# Patient Record
Sex: MALE | Race: WHITE | NOT HISPANIC OR LATINO
[De-identification: names, ages, dates, MRNs, and addresses within clinical notes are randomized per-mention and may not be internally consistent; named-entity substitution may affect disease eponyms.]

---

## 2021-08-07 PROBLEM — Z00.00 ENCOUNTER FOR PREVENTIVE HEALTH EXAMINATION: Status: ACTIVE | Noted: 2021-08-07

## 2021-08-09 ENCOUNTER — APPOINTMENT (OUTPATIENT)
Dept: GASTROENTEROLOGY | Facility: CLINIC | Age: 56
End: 2021-08-09
Payer: COMMERCIAL

## 2021-08-09 VITALS
OXYGEN SATURATION: 96 % | HEART RATE: 100 BPM | HEIGHT: 71.5 IN | BODY MASS INDEX: 34.37 KG/M2 | DIASTOLIC BLOOD PRESSURE: 100 MMHG | TEMPERATURE: 97.1 F | WEIGHT: 251 LBS | SYSTOLIC BLOOD PRESSURE: 141 MMHG

## 2021-08-09 DIAGNOSIS — Z86.59 PERSONAL HISTORY OF OTHER MENTAL AND BEHAVIORAL DISORDERS: ICD-10-CM

## 2021-08-09 DIAGNOSIS — Z82.49 FAMILY HISTORY OF ISCHEMIC HEART DISEASE AND OTHER DISEASES OF THE CIRCULATORY SYSTEM: ICD-10-CM

## 2021-08-09 DIAGNOSIS — Z86.79 PERSONAL HISTORY OF OTHER DISEASES OF THE CIRCULATORY SYSTEM: ICD-10-CM

## 2021-08-09 DIAGNOSIS — Z80.9 FAMILY HISTORY OF MALIGNANT NEOPLASM, UNSPECIFIED: ICD-10-CM

## 2021-08-09 DIAGNOSIS — K21.9 GASTRO-ESOPHAGEAL REFLUX DISEASE W/OUT ESOPHAGITIS: ICD-10-CM

## 2021-08-09 DIAGNOSIS — Z78.9 OTHER SPECIFIED HEALTH STATUS: ICD-10-CM

## 2021-08-09 PROCEDURE — 99204 OFFICE O/P NEW MOD 45 MIN: CPT

## 2021-08-09 RX ORDER — GABAPENTIN 800 MG/1
800 TABLET, COATED ORAL
Qty: 90 | Refills: 0 | Status: ACTIVE | COMMUNITY
Start: 2021-08-05

## 2021-08-09 RX ORDER — GABAPENTIN 600 MG/1
600 TABLET, COATED ORAL
Qty: 90 | Refills: 0 | Status: ACTIVE | COMMUNITY
Start: 2021-07-02

## 2021-08-09 RX ORDER — CASTOR OIL
OIL (ML) ORAL
Qty: 3 | Refills: 0 | Status: ACTIVE | COMMUNITY
Start: 2021-08-09 | End: 1900-01-01

## 2021-08-09 RX ORDER — LOSARTAN POTASSIUM 50 MG/1
50 TABLET, FILM COATED ORAL
Qty: 30 | Refills: 0 | Status: ACTIVE | COMMUNITY
Start: 2021-08-06

## 2021-08-09 RX ORDER — METHYLPHENIDATE HYDROCHLORIDE 54 MG/1
54 TABLET, EXTENDED RELEASE ORAL
Qty: 30 | Refills: 0 | Status: ACTIVE | COMMUNITY
Start: 2021-08-05

## 2021-08-09 RX ORDER — BENZTROPINE MESYLATE 1 MG/1
1 TABLET ORAL
Qty: 30 | Refills: 0 | Status: ACTIVE | COMMUNITY
Start: 2021-08-05

## 2021-08-09 RX ORDER — ROSUVASTATIN CALCIUM 20 MG/1
20 TABLET, FILM COATED ORAL
Qty: 30 | Refills: 0 | Status: ACTIVE | COMMUNITY
Start: 2021-08-06

## 2021-08-09 RX ORDER — FUROSEMIDE 20 MG/1
20 TABLET ORAL
Qty: 30 | Refills: 0 | Status: ACTIVE | COMMUNITY
Start: 2021-07-01

## 2021-08-09 RX ORDER — ARIPIPRAZOLE 5 MG/1
5 TABLET ORAL
Qty: 30 | Refills: 0 | Status: ACTIVE | COMMUNITY
Start: 2021-06-29

## 2021-08-09 RX ORDER — METOPROLOL SUCCINATE 25 MG/1
25 TABLET, EXTENDED RELEASE ORAL
Qty: 30 | Refills: 0 | Status: ACTIVE | COMMUNITY
Start: 2021-08-06

## 2021-08-09 RX ORDER — ARIPIPRAZOLE 15 MG/1
15 TABLET ORAL
Qty: 30 | Refills: 0 | Status: ACTIVE | COMMUNITY
Start: 2021-08-05

## 2021-08-09 NOTE — ASSESSMENT
[FreeTextEntry1] : GERD: The patient was advised to avoid late-night meals and dietary indiscretions.  The patient was advised to avoid fried and fatty foods.  The patient was advised to abide by an anti-GERD diet. The patient was given a pamphlet foranti-GERD.  The patient and I reviewed the anti-GERD diet at length. I recommend a trial of Pepcid 40 mg twice a day x 3 months for the symptoms.\par If the symptoms persist, the patient may require an upper endoscopy to assess for peptic ulcer disease versus esophagitis.  The patient was told of the risks and benefits of the procedure.  The patient was told of the risks of perforation, emergency surgery, bleeding, infections and missed lesions.  The patient agreed and will follow-up to reassess the symptoms.\par Family History of Colon Cancer: The patient has a family history of colon cancer.  I recommend a repeat colonoscopy to reassess for colonic polyps.  The patient was told of the risks and benefits of the procedure.  The patient was told of the risks of perforation, emergency surgery, bleeding, infections and missed lesions.  The patient agreed and will schedule for the procedure. The patient can take the antihypertensive medication with a sip of water one hour prior to the procedure. The patient is to be n.p.o. after midnight and bowel prep was given.  The patient is to return for the procedure. The patient agreed and will follow up for the procedure. \par Family History of Colon Polyp: The patient has a family history of colon polyp.  I recommend a repeat colonoscopy to assess for colonic polyps.  The patient was told of the risks and benefits of the procedure.  The patient was told of the risks of perforation, emergency surgery, bleeding, infections and missed lesions.  The patient agreed and will schedule for the procedure. The patient can take the antihypertensive medication with a sip of water one hour prior to the procedure.  The patient is to be n.p.o. after midnight and bowel prep was given.  The patient is to return for the procedure. The patient agreed and will follow up for the procedure. \par Colonoscopy: I recommend a colonoscopy to assess the symptoms.  The patient was told of the risks and benefits of the procedure.  The patient was told of the risks of perforation, emergency surgery, bleeding, infections and missed lesions.  The patient agreed and will schedule for the procedure. The patient can take the antihypertensive medication with a sip of water one hour prior to the procedure. The patient is to hold the diabetic medication the day before and the morning of the procedure. The patient is to hold the blood thinner medication for 5 days prior to the procedure. The patient is to be n.p.o. after midnight and bowel prep was given.  The patient is to return for the procedure. \par Follow-up: The patient is to follow-up in the office in 4 weeks to reassess the symptoms. The patient was told to call the office if any further problems. \par \par \par

## 2021-08-09 NOTE — REVIEW OF SYSTEMS
[Feeling Tired] : feeling tired [Eyesight Problems] : eyesight problems [SOB on Exertion] : shortness of breath during exertion [Heartburn] : heartburn [Anxiety] : anxiety [Depression] : depression [Negative] : Heme/Lymph [FreeTextEntry9] : left hip pain [de-identified] : psoriasis [de-identified] : bipolar disorder

## 2021-08-10 LAB — HEMOCCULT STL QL: NEGATIVE

## 2021-10-08 DIAGNOSIS — Z01.818 ENCOUNTER FOR OTHER PREPROCEDURAL EXAMINATION: ICD-10-CM

## 2021-10-11 ENCOUNTER — APPOINTMENT (OUTPATIENT)
Dept: DISASTER EMERGENCY | Facility: CLINIC | Age: 56
End: 2021-10-11

## 2021-10-14 ENCOUNTER — APPOINTMENT (OUTPATIENT)
Dept: GASTROENTEROLOGY | Facility: HOSPITAL | Age: 56
End: 2021-10-14

## 2022-03-09 ENCOUNTER — APPOINTMENT (OUTPATIENT)
Dept: GASTROENTEROLOGY | Facility: CLINIC | Age: 57
End: 2022-03-09
Payer: COMMERCIAL

## 2022-03-09 VITALS
BODY MASS INDEX: 34.81 KG/M2 | SYSTOLIC BLOOD PRESSURE: 153 MMHG | DIASTOLIC BLOOD PRESSURE: 104 MMHG | HEART RATE: 97 BPM | HEIGHT: 72 IN | WEIGHT: 257 LBS | OXYGEN SATURATION: 97 % | TEMPERATURE: 97.2 F

## 2022-03-09 DIAGNOSIS — Z01.818 ENCOUNTER FOR OTHER PREPROCEDURAL EXAMINATION: ICD-10-CM

## 2022-03-09 DIAGNOSIS — Z91.89 OTHER SPECIFIED PERSONAL RISK FACTORS, NOT ELSEWHERE CLASSIFIED: ICD-10-CM

## 2022-03-09 PROCEDURE — 99214 OFFICE O/P EST MOD 30 MIN: CPT

## 2022-03-09 RX ORDER — POLYETHYLENE GLYCOL 3350 AND ELECTROLYTES WITH LEMON FLAVOR 236; 22.74; 6.74; 5.86; 2.97 G/4L; G/4L; G/4L; G/4L; G/4L
236 POWDER, FOR SOLUTION ORAL
Qty: 1 | Refills: 0 | Status: ACTIVE | COMMUNITY
Start: 2022-03-09 | End: 1900-01-01

## 2022-03-09 NOTE — ASSESSMENT
[FreeTextEntry1] : Family History of Colon Cancer: The patient has a family history of colon cancer. I recommend a repeat colonoscopy to reassess for colonic polyps. The patient was told of the risks and benefits of the procedure. The patient was told of the risks of perforation, emergency surgery, bleeding, infections and missed lesions. The patient agreed and will schedule for the procedure. The patient can take the antihypertensive medication with a sip of water one hour prior to the procedure. The patient is to be n.p.o. after midnight and bowel prep was given. The patient is to return for the procedure. The patient agreed and will follow up for the procedure. \par Family History of Colon Polyp: The patient has a family history of colon polyp. I recommend a repeat colonoscopy to assess for colonic polyps. The patient was told of the risks and benefits of the procedure. The patient was told of the risks of perforation, emergency surgery, bleeding, infections and missed lesions. The patient agreed and will schedule for the procedure. The patient can take the antihypertensive medication with a sip of water one hour prior to the procedure. The patient is to be n.p.o. after midnight and bowel prep was given. The patient is to return for the procedure. The patient agreed and will follow up for the procedure. \par Colonoscopy: I recommend a colonoscopy to assess the symptoms. The patient was told of the risks and benefits of the procedure. The patient was told of the risks of perforation, emergency surgery, bleeding, infections and missed lesions. The patient agreed and will schedule for the procedure. The patient can take the antihypertensive medication with a sip of water one hour prior to the procedure.  The patient is to be n.p.o. after midnight and bowel prep was given. The patient is to return for the procedure. \par Follow-up: The patient is to follow-up in the office in 4 weeks to reassess the symptoms. The patient was told to call the office if any further problems. \par

## 2022-03-09 NOTE — HISTORY OF PRESENT ILLNESS
[None] : had no significant interval events [Heartburn] : denies heartburn [Nausea] : denies nausea [Vomiting] : denies vomiting [Diarrhea] : denies diarrhea [Constipation] : denies constipation [Yellow Skin Or Eyes (Jaundice)] : denies jaundice [Abdominal Pain] : denies abdominal pain [Abdominal Swelling] : denies abdominal swelling [Rectal Pain] : denies rectal pain [Wt Loss ___ Lbs] : recent [unfilled] ~Upound(s) weight loss [Wt Gain ___ Lbs] : no recent weight gain [GERD] : no gastroesophageal reflux disease [Hiatus Hernia] : no hiatus hernia [Peptic Ulcer Disease] : no peptic ulcer disease [Pancreatitis] : no pancreatitis [Cholelithiasis] : no cholelithiasis [Kidney Stone] : no kidney stone [Inflammatory Bowel Disease] : no inflammatory bowel disease [Irritable Bowel Syndrome] : no irritable bowel syndrome [Diverticulitis] : no diverticulitis [Alcohol Abuse] : no alcohol abuse [Malignancy] : no malignancy [Abdominal Surgery] : no abdominal surgery [Appendectomy] : no appendectomy [Cholecystectomy] : no cholecystectomy [de-identified] : The patient has a history significant for hypertension, borderline hypercholesterolemia, sleep apnea not using c-pap and bipolar disorder. The patient states that he is feeling fine. The patient denies any jaundice or pruritus.  The patient complains of chronic lower back pain. The patient denies any abdominal pain.  The patient denies any abdominal gas and bloating.  The patient denies any nausea or vomiting.  The patient complains of occasional cough while eating but denies any gastroesophageal reflux disease or dysphagia. The patient denies any atypical chest pain, shortness of breath or palpitations.  The patient denies any diaphoresis. The patient denies any constipation or diarrhea.  The patient has 1 bowel movement a day. The patient denies a change in bowel habits.  The patient denies a change in caliber of stool.  The patient denies having mucus discharge with the bowel movements.  The patient denies any bright red blood per rectum, melena or hematemesis.  The patient denies any rectal pain or rectal pruritus.  The patient complains of weight loss but denies any anorexia.  The patient admits to losing 4 pounds over the past 1 month. The patient attributes the weight loss to recent change in diet .   He denies any fevers or chills.  The patient is being followed by his cardiologist, Dr. Durbin. According to the patient, the cardiac status is stable. The patient admits to a family history of GI problems. The patient’s mother had a history of colon cancer and sister with colonic polyps.  [de-identified] : (+) prior smoking 2 cigarettes a day x 2 years, stopped x 20 years, (-) ETOH, (-) IVDA\par

## 2022-04-06 LAB — SARS-COV-2 N GENE NPH QL NAA+PROBE: NOT DETECTED

## 2022-04-07 ENCOUNTER — APPOINTMENT (OUTPATIENT)
Dept: GASTROENTEROLOGY | Facility: HOSPITAL | Age: 57
End: 2022-04-07

## 2022-04-07 ENCOUNTER — RESULT REVIEW (OUTPATIENT)
Age: 57
End: 2022-04-07

## 2022-04-07 ENCOUNTER — OUTPATIENT (OUTPATIENT)
Dept: OUTPATIENT SERVICES | Facility: HOSPITAL | Age: 57
LOS: 1 days | End: 2022-04-07
Payer: COMMERCIAL

## 2022-04-07 DIAGNOSIS — Z91.89 OTHER SPECIFIED PERSONAL RISK FACTORS, NOT ELSEWHERE CLASSIFIED: ICD-10-CM

## 2022-04-07 PROCEDURE — 88305 TISSUE EXAM BY PATHOLOGIST: CPT

## 2022-04-07 PROCEDURE — 45380 COLONOSCOPY AND BIOPSY: CPT

## 2022-04-07 PROCEDURE — 45380 COLONOSCOPY AND BIOPSY: CPT | Mod: PT

## 2022-04-07 PROCEDURE — 88305 TISSUE EXAM BY PATHOLOGIST: CPT | Mod: 26

## 2022-04-11 ENCOUNTER — NON-APPOINTMENT (OUTPATIENT)
Age: 57
End: 2022-04-11

## 2022-04-11 LAB — SURGICAL PATHOLOGY STUDY: SIGNIFICANT CHANGE UP

## 2022-07-12 ENCOUNTER — INPATIENT (INPATIENT)
Facility: HOSPITAL | Age: 57
LOS: 1 days | Discharge: HOME | DRG: 69 | End: 2022-07-14
Attending: STUDENT IN AN ORGANIZED HEALTH CARE EDUCATION/TRAINING PROGRAM | Admitting: STUDENT IN AN ORGANIZED HEALTH CARE EDUCATION/TRAINING PROGRAM
Payer: COMMERCIAL

## 2022-07-12 VITALS
OXYGEN SATURATION: 96 % | DIASTOLIC BLOOD PRESSURE: 85 MMHG | TEMPERATURE: 98 F | SYSTOLIC BLOOD PRESSURE: 131 MMHG | WEIGHT: 238.1 LBS | HEIGHT: 72 IN | HEART RATE: 87 BPM | RESPIRATION RATE: 16 BRPM

## 2022-07-12 DIAGNOSIS — G45.9 TRANSIENT CEREBRAL ISCHEMIC ATTACK, UNSPECIFIED: ICD-10-CM

## 2022-07-12 LAB
ALBUMIN SERPL ELPH-MCNC: 3.6 G/DL — SIGNIFICANT CHANGE UP (ref 3.5–5)
ALP SERPL-CCNC: 84 U/L — SIGNIFICANT CHANGE UP (ref 40–120)
ALT FLD-CCNC: 47 U/L DA — SIGNIFICANT CHANGE UP (ref 10–60)
ANION GAP SERPL CALC-SCNC: 2 MMOL/L — LOW (ref 5–17)
APTT BLD: 32.5 SEC — SIGNIFICANT CHANGE UP (ref 27.5–35.5)
AST SERPL-CCNC: 28 U/L — SIGNIFICANT CHANGE UP (ref 10–40)
BASOPHILS # BLD AUTO: 0.02 K/UL — SIGNIFICANT CHANGE UP (ref 0–0.2)
BASOPHILS NFR BLD AUTO: 0.2 % — SIGNIFICANT CHANGE UP (ref 0–2)
BILIRUB SERPL-MCNC: 0.6 MG/DL — SIGNIFICANT CHANGE UP (ref 0.2–1.2)
BUN SERPL-MCNC: 21 MG/DL — HIGH (ref 7–18)
CALCIUM SERPL-MCNC: 9.4 MG/DL — SIGNIFICANT CHANGE UP (ref 8.4–10.5)
CHLORIDE SERPL-SCNC: 106 MMOL/L — SIGNIFICANT CHANGE UP (ref 96–108)
CK SERPL-CCNC: 125 U/L — SIGNIFICANT CHANGE UP (ref 35–232)
CO2 SERPL-SCNC: 31 MMOL/L — SIGNIFICANT CHANGE UP (ref 22–31)
CREAT SERPL-MCNC: 1.29 MG/DL — SIGNIFICANT CHANGE UP (ref 0.5–1.3)
EGFR: 64 ML/MIN/1.73M2 — SIGNIFICANT CHANGE UP
EOSINOPHIL # BLD AUTO: 0.08 K/UL — SIGNIFICANT CHANGE UP (ref 0–0.5)
EOSINOPHIL NFR BLD AUTO: 1 % — SIGNIFICANT CHANGE UP (ref 0–6)
GLUCOSE SERPL-MCNC: 85 MG/DL — SIGNIFICANT CHANGE UP (ref 70–99)
HCT VFR BLD CALC: 46 % — SIGNIFICANT CHANGE UP (ref 39–50)
HGB BLD-MCNC: 15.3 G/DL — SIGNIFICANT CHANGE UP (ref 13–17)
IMM GRANULOCYTES NFR BLD AUTO: 0.4 % — SIGNIFICANT CHANGE UP (ref 0–1.5)
INR BLD: 0.97 RATIO — SIGNIFICANT CHANGE UP (ref 0.88–1.16)
LYMPHOCYTES # BLD AUTO: 2.79 K/UL — SIGNIFICANT CHANGE UP (ref 1–3.3)
LYMPHOCYTES # BLD AUTO: 33.4 % — SIGNIFICANT CHANGE UP (ref 13–44)
MCHC RBC-ENTMCNC: 28.7 PG — SIGNIFICANT CHANGE UP (ref 27–34)
MCHC RBC-ENTMCNC: 33.3 GM/DL — SIGNIFICANT CHANGE UP (ref 32–36)
MCV RBC AUTO: 86.3 FL — SIGNIFICANT CHANGE UP (ref 80–100)
MONOCYTES # BLD AUTO: 0.81 K/UL — SIGNIFICANT CHANGE UP (ref 0–0.9)
MONOCYTES NFR BLD AUTO: 9.7 % — SIGNIFICANT CHANGE UP (ref 2–14)
NEUTROPHILS # BLD AUTO: 4.62 K/UL — SIGNIFICANT CHANGE UP (ref 1.8–7.4)
NEUTROPHILS NFR BLD AUTO: 55.3 % — SIGNIFICANT CHANGE UP (ref 43–77)
NRBC # BLD: 0 /100 WBCS — SIGNIFICANT CHANGE UP (ref 0–0)
PLATELET # BLD AUTO: 228 K/UL — SIGNIFICANT CHANGE UP (ref 150–400)
POTASSIUM SERPL-MCNC: 4.2 MMOL/L — SIGNIFICANT CHANGE UP (ref 3.5–5.3)
POTASSIUM SERPL-SCNC: 4.2 MMOL/L — SIGNIFICANT CHANGE UP (ref 3.5–5.3)
PROT SERPL-MCNC: 7.3 G/DL — SIGNIFICANT CHANGE UP (ref 6–8.3)
PROTHROM AB SERPL-ACNC: 11.5 SEC — SIGNIFICANT CHANGE UP (ref 10.5–13.4)
RBC # BLD: 5.33 M/UL — SIGNIFICANT CHANGE UP (ref 4.2–5.8)
RBC # FLD: 13.2 % — SIGNIFICANT CHANGE UP (ref 10.3–14.5)
SARS-COV-2 RNA SPEC QL NAA+PROBE: SIGNIFICANT CHANGE UP
SODIUM SERPL-SCNC: 139 MMOL/L — SIGNIFICANT CHANGE UP (ref 135–145)
TROPONIN I, HIGH SENSITIVITY RESULT: 6.6 NG/L — SIGNIFICANT CHANGE UP
WBC # BLD: 8.35 K/UL — SIGNIFICANT CHANGE UP (ref 3.8–10.5)
WBC # FLD AUTO: 8.35 K/UL — SIGNIFICANT CHANGE UP (ref 3.8–10.5)

## 2022-07-12 PROCEDURE — 99285 EMERGENCY DEPT VISIT HI MDM: CPT

## 2022-07-12 PROCEDURE — 93010 ELECTROCARDIOGRAM REPORT: CPT

## 2022-07-12 PROCEDURE — 99223 1ST HOSP IP/OBS HIGH 75: CPT | Mod: GC

## 2022-07-12 PROCEDURE — 70450 CT HEAD/BRAIN W/O DYE: CPT | Mod: 26,MA

## 2022-07-12 NOTE — ED PROVIDER NOTE - OBJECTIVE STATEMENT
57 year old male with history of diabetes and bipolar coming in with 5 min episode of visual lost in right eye. Patient describes that the top half of the visual field turned black for 5 min and then turned to normal. Patient denies chest pain, dizziness, and slurred speech. Patient has allergy to penicillin. 57 year old male with history of diabetes and bipolar coming in with 5 min episode of visual loss in right eye. Patient describes that the top half of the visual field turned black for 5 min and then turned to normal. Patient denies chest pain, dizziness, and slurred speech. Patient has allergy to penicillin.

## 2022-07-12 NOTE — H&P ADULT - ASSESSMENT
57 year old male with a past medical history of BPH, Bipolar, HLD, HTN coming to ED complaining of vision loss in his right eye, admitted for stroke rule out.

## 2022-07-12 NOTE — ED ADULT TRIAGE NOTE - CHIEF COMPLAINT QUOTE
decrease in vision (top half) on right eye today, denies any injury or pain. States vision is improving and almost back to normal.

## 2022-07-12 NOTE — ED PROVIDER NOTE - CLINICAL SUMMARY MEDICAL DECISION MAKING FREE TEXT BOX
Patient with transient visual lost. Case discussed with neurologist. Case suspicious for TIA. Patient with transient visual lost. Case discussed with neurologist. Case suspicious for TIA. admit for stroke workup

## 2022-07-12 NOTE — H&P ADULT - ATTENDING COMMENTS
Vital Signs Last 24 Hrs  T(C): 36.8 (12 Jul 2022 19:42), Max: 36.8 (12 Jul 2022 19:42)  T(F): 98.2 (12 Jul 2022 19:42), Max: 98.2 (12 Jul 2022 19:42)  HR: 89 (12 Jul 2022 19:42) (87 - 89)  BP: 110/79 (12 Jul 2022 19:42) (110/79 - 131/85)  BP(mean): --  RR: 17 (12 Jul 2022 19:42) (16 - 17)  SpO2: 95% (12 Jul 2022 19:42) (95% - 96%)    Parameters below as of 12 Jul 2022 19:42  Patient On (Oxygen Delivery Method): room air Patient is a 58 yo male with PMH of Bipolar disorder, HLD, HTN, BPH p/w c/o sudden vision loss in his right eye. Patient states that at around 2 PM today, he suddenly lost vision in the top half field of his right eye. He also endorses feeling dizzy at that time. His vision returned after 5 minutes. Denies any limb weakness or sensory deficit or paraesthesias. No prior episode in past. He denies any headaches, fevers, chills, chest pain, cough, SOB, abd pain, n/v, diarrhea, constipation hematuria, dysuria, numbness, and weakness.    In ED patient AAOx3, afebrile, HR 87, /85, Spo2 96% RA   neck supple, EOMI, PERRLA  chest CTA b/l, RRR  abd soft, nt, nd  no FND, intact visual fields    Initial Labs essentially wnl, EKG NSR  CT head negative for acute ischemia or bleed.    Vital Signs Last 24 Hrs  T(C): 36.8 (12 Jul 2022 19:42), Max: 36.8 (12 Jul 2022 19:42)  T(F): 98.2 (12 Jul 2022 19:42), Max: 98.2 (12 Jul 2022 19:42)  HR: 89 (12 Jul 2022 19:42) (87 - 89)  BP: 110/79 (12 Jul 2022 19:42) (110/79 - 131/85)  BP(mean): --  RR: 17 (12 Jul 2022 19:42) (16 - 17)  SpO2: 95% (12 Jul 2022 19:42) (95% - 96%)    Parameters below as of 12 Jul 2022 19:42  Patient On (Oxygen Delivery Method): room air    Labs and imaging studies noted.    Assessment and plan: Patient is a 58 yo male with PMH of Bipolar disorder, HLD, HTN, BPH p/w c/o sudden vision loss in his right eye, with complete resolution of symptoms within 5-10 minutes admitted for evaluation of TIA.    Hemianopia   TIA  HTN  HLD  Bipolar disorder  BPH    - p/w c/o sudden onset of loss of upper visual field, with resolution of symptoms in 5 minutes.  - no residual neuro deficit in ED, symptoms resolved  - ABCD2 score0, NIHSS score 0 on arrival.  - patient admitted for concern of TIA  - CT head negative for acute ischemia or bleed  - started on ASA, statins  - follow HbA1c, lipid panel, ECHO w/ bubble study.  - telemonitoring  - patient will benefit from MRI/MRA to evaluate for large vessel occlusion.  - Neuro consult Dr. Osboren  - BP wnl, will hold Losartan, permissive HTN for 24 hours. resume BB, to avoid reflex tachycardia.  - resume bipolar medications on Abilify.   - s/c Lovenox for DVT ppx. Patient is a 58 yo male with PMH of Bipolar disorder, HLD, HTN, BPH p/w c/o sudden vision loss in his right eye. Patient states that at around 5: 30 PM today, he suddenly lost vision in the top half field of his right visual field.  He also endorses feeling lightheaded prior to and after the episode.. His vision returned after 5 minutes, tthat was blurry initialy but later improved back to baseline. Denies any limb weakness or sensory deficit or paraesthesias. No prior episode in past. He denies any headaches, fevers, chills, chest pain, cough, SOB, abd pain, n/v, diarrhea, constipation hematuria, dysuria, numbness, and weakness.  Family hx of CVA in father at the age of 90.     In ED patient AAOx3, afebrile, HR 87, /85, Spo2 96% RA   neck supple, EOMI, PERRLA  chest CTA b/l, RRR  abd soft, nt, nd  no FND, intact visual fields    Initial Labs essentially wnl, EKG NSR  CT head negative for acute ischemia or bleed.    Vital Signs Last 24 Hrs  T(C): 36.8 (12 Jul 2022 19:42), Max: 36.8 (12 Jul 2022 19:42)  T(F): 98.2 (12 Jul 2022 19:42), Max: 98.2 (12 Jul 2022 19:42)  HR: 89 (12 Jul 2022 19:42) (87 - 89)  BP: 110/79 (12 Jul 2022 19:42) (110/79 - 131/85)  BP(mean): --  RR: 17 (12 Jul 2022 19:42) (16 - 17)  SpO2: 95% (12 Jul 2022 19:42) (95% - 96%)    Parameters below as of 12 Jul 2022 19:42  Patient On (Oxygen Delivery Method): room air    Labs and imaging studies noted.    Assessment and plan: Patient is a 58 yo male with PMH of Bipolar disorder, HLD, HTN, BPH p/w c/o sudden vision loss in his right eye, with complete resolution of symptoms within 5-10 minutes admitted for evaluation of TIA.    Hemianopia   TIA  HTN  HLD  Bipolar disorder  BPH    - p/w c/o sudden onset of loss of upper visual field, with resolution of symptoms in 5 minutes.  - no residual neuro deficit in ED, symptoms resolved  - ABCD2 score0, NIHSS score 0 on arrival.  - patient admitted for concern of TIA  - CT head negative for acute ischemia or bleed  - started on ASA, statins  - follow HbA1c, lipid panel, ECHO w/ bubble study.  - telemonitoring.  - patient will benefit from MRI/MRA to evaluate for large vessel occlusion.  - Neuro consult Dr. Osborne  - BP wnl, will hold Losartan, permissive HTN for 24 hours. resume BB, to avoid reflex tachycardia.  - resume bipolar medications on Abilify.   - s/c Lovenox for DVT ppx.

## 2022-07-12 NOTE — H&P ADULT - HISTORY OF PRESENT ILLNESS
57 year old male with a past medical history of BPH, Bipolar, HLD, HTN coming to ED complaining of vision loss in his right eye. Patient states that at around 2 PM today, he suddenly lost vision in the top half field of his right eye. He also endorses feeling dizzy at that time. His vision returned after 5 minutes. He denies any headaches, fevers, chills, chest pain, cough, SOB, abd pain, n/v, diarrhea, constipation hematuria, dysuria, numbness, and weakness.    In ED VS: T 97.7, HR 87, /85, RR 16, Spo2 96% RA   CT head negative

## 2022-07-12 NOTE — H&P ADULT - PROBLEM SELECTOR PLAN 1
pt p/w transient vision loss. Vision Is now back to normal  CT head negative  Physical exam negative for focal neurological deficits  Neuro consult  c/w ASA, statin, and plavix.  EKG NSR   Cardiac tele pt p/w transient vision loss. Vision Is now back to normal  CT head negative  Physical exam negative for focal neurological deficits  Neuro consult  EKG NSR   Cardiac tele  f/u echo pt p/w transient vision loss. Vision Is now back to normal  CT head negative  Physical exam negative for focal neurological deficits  Neuro consulted Dr. Osborne   EKG NSR   Cardiac tele  f/u echo

## 2022-07-13 DIAGNOSIS — I10 ESSENTIAL (PRIMARY) HYPERTENSION: ICD-10-CM

## 2022-07-13 DIAGNOSIS — Z29.9 ENCOUNTER FOR PROPHYLACTIC MEASURES, UNSPECIFIED: ICD-10-CM

## 2022-07-13 DIAGNOSIS — G45.9 TRANSIENT CEREBRAL ISCHEMIC ATTACK, UNSPECIFIED: ICD-10-CM

## 2022-07-13 DIAGNOSIS — F31.9 BIPOLAR DISORDER, UNSPECIFIED: ICD-10-CM

## 2022-07-13 DIAGNOSIS — E78.5 HYPERLIPIDEMIA, UNSPECIFIED: ICD-10-CM

## 2022-07-13 DIAGNOSIS — H54.7 UNSPECIFIED VISUAL LOSS: ICD-10-CM

## 2022-07-13 LAB
A1C WITH ESTIMATED AVERAGE GLUCOSE RESULT: 5.8 % — HIGH (ref 4–5.6)
ALBUMIN SERPL ELPH-MCNC: 3.4 G/DL — LOW (ref 3.5–5)
ALP SERPL-CCNC: 83 U/L — SIGNIFICANT CHANGE UP (ref 40–120)
ALT FLD-CCNC: 47 U/L DA — SIGNIFICANT CHANGE UP (ref 10–60)
ANION GAP SERPL CALC-SCNC: 10 MMOL/L — SIGNIFICANT CHANGE UP (ref 5–17)
AST SERPL-CCNC: 24 U/L — SIGNIFICANT CHANGE UP (ref 10–40)
BILIRUB SERPL-MCNC: 0.8 MG/DL — SIGNIFICANT CHANGE UP (ref 0.2–1.2)
BUN SERPL-MCNC: 19 MG/DL — HIGH (ref 7–18)
CALCIUM SERPL-MCNC: 9.2 MG/DL — SIGNIFICANT CHANGE UP (ref 8.4–10.5)
CHLORIDE SERPL-SCNC: 105 MMOL/L — SIGNIFICANT CHANGE UP (ref 96–108)
CHOLEST SERPL-MCNC: 74 MG/DL — SIGNIFICANT CHANGE UP
CO2 SERPL-SCNC: 25 MMOL/L — SIGNIFICANT CHANGE UP (ref 22–31)
CREAT SERPL-MCNC: 1.12 MG/DL — SIGNIFICANT CHANGE UP (ref 0.5–1.3)
CRP SERPL-MCNC: <3 MG/L — SIGNIFICANT CHANGE UP
EGFR: 76 ML/MIN/1.73M2 — SIGNIFICANT CHANGE UP
ERYTHROCYTE [SEDIMENTATION RATE] IN BLOOD: 14 MM/HR — SIGNIFICANT CHANGE UP (ref 0–20)
ESTIMATED AVERAGE GLUCOSE: 120 MG/DL — HIGH (ref 68–114)
GLUCOSE SERPL-MCNC: 88 MG/DL — SIGNIFICANT CHANGE UP (ref 70–99)
HCT VFR BLD CALC: 47.2 % — SIGNIFICANT CHANGE UP (ref 39–50)
HCV AB S/CO SERPL IA: 0.07 S/CO — SIGNIFICANT CHANGE UP (ref 0–0.99)
HCV AB SERPL-IMP: SIGNIFICANT CHANGE UP
HDLC SERPL-MCNC: 30 MG/DL — LOW
HGB BLD-MCNC: 15.2 G/DL — SIGNIFICANT CHANGE UP (ref 13–17)
LIPID PNL WITH DIRECT LDL SERPL: 22 MG/DL — SIGNIFICANT CHANGE UP
MAGNESIUM SERPL-MCNC: 2.3 MG/DL — SIGNIFICANT CHANGE UP (ref 1.6–2.6)
MCHC RBC-ENTMCNC: 27.7 PG — SIGNIFICANT CHANGE UP (ref 27–34)
MCHC RBC-ENTMCNC: 32.2 GM/DL — SIGNIFICANT CHANGE UP (ref 32–36)
MCV RBC AUTO: 86.1 FL — SIGNIFICANT CHANGE UP (ref 80–100)
NON HDL CHOLESTEROL: 44 MG/DL — SIGNIFICANT CHANGE UP
NRBC # BLD: 0 /100 WBCS — SIGNIFICANT CHANGE UP (ref 0–0)
PHOSPHATE SERPL-MCNC: 3.2 MG/DL — SIGNIFICANT CHANGE UP (ref 2.5–4.5)
PLATELET # BLD AUTO: 231 K/UL — SIGNIFICANT CHANGE UP (ref 150–400)
POTASSIUM SERPL-MCNC: 3.5 MMOL/L — SIGNIFICANT CHANGE UP (ref 3.5–5.3)
POTASSIUM SERPL-SCNC: 3.5 MMOL/L — SIGNIFICANT CHANGE UP (ref 3.5–5.3)
PROT SERPL-MCNC: 6.8 G/DL — SIGNIFICANT CHANGE UP (ref 6–8.3)
RBC # BLD: 5.48 M/UL — SIGNIFICANT CHANGE UP (ref 4.2–5.8)
RBC # FLD: 13.2 % — SIGNIFICANT CHANGE UP (ref 10.3–14.5)
SODIUM SERPL-SCNC: 140 MMOL/L — SIGNIFICANT CHANGE UP (ref 135–145)
TRIGL SERPL-MCNC: 110 MG/DL — SIGNIFICANT CHANGE UP
TSH SERPL-MCNC: 3.12 UU/ML — SIGNIFICANT CHANGE UP (ref 0.34–4.82)
WBC # BLD: 8.46 K/UL — SIGNIFICANT CHANGE UP (ref 3.8–10.5)
WBC # FLD AUTO: 8.46 K/UL — SIGNIFICANT CHANGE UP (ref 3.8–10.5)

## 2022-07-13 PROCEDURE — 93880 EXTRACRANIAL BILAT STUDY: CPT | Mod: 26

## 2022-07-13 PROCEDURE — 99223 1ST HOSP IP/OBS HIGH 75: CPT

## 2022-07-13 PROCEDURE — 71046 X-RAY EXAM CHEST 2 VIEWS: CPT | Mod: 26

## 2022-07-13 PROCEDURE — 70544 MR ANGIOGRAPHY HEAD W/O DYE: CPT | Mod: 26,59

## 2022-07-13 PROCEDURE — 70551 MRI BRAIN STEM W/O DYE: CPT | Mod: 26

## 2022-07-13 PROCEDURE — 99232 SBSQ HOSP IP/OBS MODERATE 35: CPT

## 2022-07-13 RX ORDER — CLOPIDOGREL BISULFATE 75 MG/1
300 TABLET, FILM COATED ORAL ONCE
Refills: 0 | Status: DISCONTINUED | OUTPATIENT
Start: 2022-07-13 | End: 2022-07-13

## 2022-07-13 RX ORDER — ENOXAPARIN SODIUM 100 MG/ML
40 INJECTION SUBCUTANEOUS EVERY 24 HOURS
Refills: 0 | Status: DISCONTINUED | OUTPATIENT
Start: 2022-07-13 | End: 2022-07-14

## 2022-07-13 RX ORDER — CLOPIDOGREL BISULFATE 75 MG/1
75 TABLET, FILM COATED ORAL DAILY
Refills: 0 | Status: DISCONTINUED | OUTPATIENT
Start: 2022-07-13 | End: 2022-07-14

## 2022-07-13 RX ORDER — HYDROCHLOROTHIAZIDE 25 MG
12.5 TABLET ORAL DAILY
Refills: 0 | Status: DISCONTINUED | OUTPATIENT
Start: 2022-07-13 | End: 2022-07-14

## 2022-07-13 RX ORDER — BENZTROPINE MESYLATE 1 MG
1 TABLET ORAL
Refills: 0 | Status: DISCONTINUED | OUTPATIENT
Start: 2022-07-13 | End: 2022-07-14

## 2022-07-13 RX ORDER — GABAPENTIN 400 MG/1
1 CAPSULE ORAL
Qty: 0 | Refills: 0 | DISCHARGE

## 2022-07-13 RX ORDER — ARIPIPRAZOLE 15 MG/1
15 TABLET ORAL DAILY
Refills: 0 | Status: DISCONTINUED | OUTPATIENT
Start: 2022-07-13 | End: 2022-07-14

## 2022-07-13 RX ORDER — METOPROLOL TARTRATE 50 MG
25 TABLET ORAL DAILY
Refills: 0 | Status: DISCONTINUED | OUTPATIENT
Start: 2022-07-13 | End: 2022-07-14

## 2022-07-13 RX ORDER — BENZTROPINE MESYLATE 1 MG
1 TABLET ORAL
Qty: 0 | Refills: 0 | DISCHARGE

## 2022-07-13 RX ORDER — TAMSULOSIN HYDROCHLORIDE 0.4 MG/1
1 CAPSULE ORAL
Qty: 0 | Refills: 0 | DISCHARGE

## 2022-07-13 RX ORDER — METHYLPHENIDATE HCL 5 MG
54 TABLET ORAL EVERY MORNING
Refills: 0 | Status: DISCONTINUED | OUTPATIENT
Start: 2022-07-13 | End: 2022-07-14

## 2022-07-13 RX ORDER — ATORVASTATIN CALCIUM 80 MG/1
40 TABLET, FILM COATED ORAL AT BEDTIME
Refills: 0 | Status: DISCONTINUED | OUTPATIENT
Start: 2022-07-13 | End: 2022-07-14

## 2022-07-13 RX ORDER — GABAPENTIN 400 MG/1
800 CAPSULE ORAL THREE TIMES A DAY
Refills: 0 | Status: DISCONTINUED | OUTPATIENT
Start: 2022-07-13 | End: 2022-07-14

## 2022-07-13 RX ORDER — LOSARTAN POTASSIUM 100 MG/1
100 TABLET, FILM COATED ORAL DAILY
Refills: 0 | Status: DISCONTINUED | OUTPATIENT
Start: 2022-07-13 | End: 2022-07-13

## 2022-07-13 RX ORDER — ASPIRIN/CALCIUM CARB/MAGNESIUM 324 MG
81 TABLET ORAL DAILY
Refills: 0 | Status: DISCONTINUED | OUTPATIENT
Start: 2022-07-13 | End: 2022-07-14

## 2022-07-13 RX ORDER — ATORVASTATIN CALCIUM 80 MG/1
80 TABLET, FILM COATED ORAL AT BEDTIME
Refills: 0 | Status: DISCONTINUED | OUTPATIENT
Start: 2022-07-13 | End: 2022-07-13

## 2022-07-13 RX ORDER — METHYLPHENIDATE HCL 5 MG
1 TABLET ORAL
Qty: 0 | Refills: 0 | DISCHARGE

## 2022-07-13 RX ORDER — METOPROLOL TARTRATE 50 MG
1 TABLET ORAL
Qty: 0 | Refills: 0 | DISCHARGE

## 2022-07-13 RX ORDER — LOSARTAN POTASSIUM 100 MG/1
1 TABLET, FILM COATED ORAL
Qty: 0 | Refills: 0 | DISCHARGE

## 2022-07-13 RX ORDER — ARIPIPRAZOLE 15 MG/1
1 TABLET ORAL
Qty: 0 | Refills: 0 | DISCHARGE

## 2022-07-13 RX ORDER — TAMSULOSIN HYDROCHLORIDE 0.4 MG/1
0.4 CAPSULE ORAL AT BEDTIME
Refills: 0 | Status: DISCONTINUED | OUTPATIENT
Start: 2022-07-13 | End: 2022-07-14

## 2022-07-13 RX ORDER — ASPIRIN/CALCIUM CARB/MAGNESIUM 324 MG
325 TABLET ORAL ONCE
Refills: 0 | Status: DISCONTINUED | OUTPATIENT
Start: 2022-07-13 | End: 2022-07-13

## 2022-07-13 RX ADMIN — Medication 1 MILLIGRAM(S): at 18:13

## 2022-07-13 RX ADMIN — TAMSULOSIN HYDROCHLORIDE 0.4 MILLIGRAM(S): 0.4 CAPSULE ORAL at 23:01

## 2022-07-13 RX ADMIN — GABAPENTIN 800 MILLIGRAM(S): 400 CAPSULE ORAL at 23:01

## 2022-07-13 RX ADMIN — ENOXAPARIN SODIUM 40 MILLIGRAM(S): 100 INJECTION SUBCUTANEOUS at 05:49

## 2022-07-13 RX ADMIN — Medication 1 MILLIGRAM(S): at 05:49

## 2022-07-13 RX ADMIN — ARIPIPRAZOLE 15 MILLIGRAM(S): 15 TABLET ORAL at 12:33

## 2022-07-13 RX ADMIN — Medication 25 MILLIGRAM(S): at 05:53

## 2022-07-13 RX ADMIN — ATORVASTATIN CALCIUM 40 MILLIGRAM(S): 80 TABLET, FILM COATED ORAL at 23:02

## 2022-07-13 RX ADMIN — CLOPIDOGREL BISULFATE 75 MILLIGRAM(S): 75 TABLET, FILM COATED ORAL at 11:59

## 2022-07-13 RX ADMIN — Medication 81 MILLIGRAM(S): at 11:59

## 2022-07-13 RX ADMIN — Medication 12.5 MILLIGRAM(S): at 05:49

## 2022-07-13 RX ADMIN — GABAPENTIN 800 MILLIGRAM(S): 400 CAPSULE ORAL at 05:49

## 2022-07-13 RX ADMIN — GABAPENTIN 800 MILLIGRAM(S): 400 CAPSULE ORAL at 18:14

## 2022-07-13 NOTE — CONSULT NOTE ADULT - ASSESSMENT
Right upper visual field cut transient concern for TIA involving left occipital lobe, large vessels, concerning for stenosis vs. embolic event.    Recommendations:  1.             Admit to telemetry   2.             MRI brain, MRA head without contrast, Carotid duplex (CD).  If unable to get MR imaging, please consider CTA head and neck in 24hours (no need for CD in this case).  If the patient is unable to get MR and unable to get IV contrast please repeat the CTH in 24hours and get a CD.  3.             TTE  4.             Please check HbA1C and fasting lipid profile  5.             secondary stroke prevention: ASA 81mg  and Lipitor 40mg HS.  Plavixx x 3 weeks.  6. PT evaluation  7.          STAT CTH IF the patient has sudden change in mental status or neurological exam  8.          DVT PPx    Thank you for the courtesy of this consult.    arvind NP

## 2022-07-13 NOTE — ED ADULT NURSE NOTE - OBJECTIVE STATEMENT
decrease in vision (top half) on right eye today, denies any injury or pain. States vision is improving and almost back to normal..bld.drawn  and sent to labs

## 2022-07-13 NOTE — PATIENT PROFILE ADULT - FALL HARM RISK - UNIVERSAL INTERVENTIONS
Bed in lowest position, wheels locked, appropriate side rails in place/Call bell, personal items and telephone in reach/Instruct patient to call for assistance before getting out of bed or chair/Non-slip footwear when patient is out of bed/[place] to call system/Physically safe environment - no spills, clutter or unnecessary equipment/Purposeful Proactive Rounding/Room/bathroom lighting operational, light cord in reach

## 2022-07-13 NOTE — PROGRESS NOTE ADULT - NS ATTEND AMEND GEN_ALL_CORE FT
Patient is a 56 yo male with PMH of Bipolar disorder, HLD, HTN, BPH p/w c/o sudden vision loss in his right eye, with complete resolution of symptoms within 5-10 minutes admitted for evaluation of TIA.    Pt was seen and examined, denies any new complaints. No further visual loss     PE as above     Labs reviewed    A/P:  Transient ischemic attack   HTN  HLD  BPH  Bipolar disorder    -Appreciate neuro recommendations, will get MR brain, MRA, ECHO, carotid dopplers and c/w tele-no events so far  -C/w asa, Plavix, statin   -BP stable on anti-htn agents  -C/w home medications   -Discussed care plan w/ patient.

## 2022-07-13 NOTE — PROGRESS NOTE ADULT - PROBLEM SELECTOR PLAN 1
transient vision loss, vison has returned to baseline  pt is a , uses laptop "most of day", no screen glare protection  no headache/dizziness  CTH no acute pathology  seen by neurology appreciate recs: MR brain MRA head DAPT, lipitor 40mg, carotids (all ordered)  echo completed: not resulted yet as is carotid doppler  A1c 5.8%  monitor transient vision loss, vison has returned to baseline  pt is a , uses laptop "most of day", no screen glare protection  no headache/dizziness  CTH no acute pathology  seen by neurology appreciate recs: MR brain MRA head asa; plavix-->(x 3weeks), lipitor 40mg, carotids (all ordered)  echo completed: not resulted yet as is carotid doppler  A1c 5.8%  monitor

## 2022-07-13 NOTE — ED ADULT NURSE NOTE - TEMPLATE LIST FOR HEAD TO TOE ASSESSMENT
General

## 2022-07-13 NOTE — PROGRESS NOTE ADULT - SUBJECTIVE AND OBJECTIVE BOX
Chart and meds reviewed.  Patient seen and examined.    HPI: 57 year old male with a past medical history of BPH, Bipolar, HLD, HTN coming to ED complaining of vision loss in his right eye. Patient states that at around 2 PM today, he suddenly lost vision in the top half field of his right eye. He also endorses feeling dizzy at that time. His vision returned after 5 minutes. He denies any headaches, fevers, chills, chest pain, cough, SOB, abd pain, n/v, diarrhea, constipation hematuria, dysuria, numbness, and weakness.  In ED VS: T 97.7, HR 87, /85, RR 16, Spo2 96% RA   CT head negative  (12 Jul 2022 23:31)    Interval hx / ROS: no significant event since admission, symptoms have resolved; denies CP/palpitation/SOB/HA/dizziness/abd pain/n/v/d/f/c    MEDICATIONS  (STANDING):  ARIPiprazole 15 milliGRAM(s) Oral daily  aspirin  chewable 81 milliGRAM(s) Oral daily  atorvastatin 40 milliGRAM(s) Oral at bedtime  benztropine 1 milliGRAM(s) Oral two times a day  clopidogrel Tablet 75 milliGRAM(s) Oral daily  enoxaparin Injectable 40 milliGRAM(s) SubCutaneous every 24 hours  gabapentin 800 milliGRAM(s) Oral three times a day  hydrochlorothiazide 12.5 milliGRAM(s) Oral daily  methylphenidate ER (CONCERTA) 54 milliGRAM(s) Oral every morning  metoprolol succinate ER 25 milliGRAM(s) Oral daily  tamsulosin 0.4 milliGRAM(s) Oral at bedtime    MEDICATIONS  (PRN):      VITALS:  Vital Signs Last 24 Hrs  T(C): 36.3 (13 Jul 2022 11:07), Max: 36.9 (13 Jul 2022 00:08)  T(F): 97.4 (13 Jul 2022 11:07), Max: 98.4 (13 Jul 2022 00:08)  HR: 82 (13 Jul 2022 11:07) (79 - 89)  BP: 130/91 (13 Jul 2022 11:07) (110/79 - 139/90)  BP(mean): --  RR: 18 (13 Jul 2022 11:07) (16 - 18)  SpO2: 97% (13 Jul 2022 11:07) (95% - 98%)    Parameters below as of 13 Jul 2022 11:07  Patient On (Oxygen Delivery Method): room air        PHYSICAL EXAM:    HEENT:  pupils equal and reactive, EOMI, sclera clear, no discharge, no oropharyngeal lesions, erythema, exudates, oral thrush    NECK:   supple, no carotid bruits, no palpable lymph nodes, no thyromegaly    CV:  +S1, +S2, regular, no murmurs or rubs    RESP:   lungs clear to auscultation bilaterally, no wheezing, rales, rhonchi, good air entry bilaterally    BREAST:  not examined    GI:  abdomen soft, non-tender, non-distended, normal BS, no bruits, no abdominal masses, no palpable masses    RECTAL:  not examined    :  voiding well spontaneously    MSK:   normal muscle tone, no atrophy, no rigidity, no contractions    EXT:   no clubbing, no cyanosis, no edema, no calf pain, swelling or erythema    VASCULAR:  pulses equal and symmetric in the upper and lower extremities    NEURO:  AAOX3, speech clear, follows all commands, strength 5/5 BUE / BLE, able to move extremities spontaneously, gait steady;   no focal neurological deficits,     SKIN:  no ulcers, lesions or rashes      LABS:                       15.2   8.46  )-----------( 231      ( 13 Jul 2022 06:13 )             47.2     07-13    140  |  105  |  19<H>  ----------------------------<  88  3.5   |  25  |  1.12    Ca    9.2      13 Jul 2022 06:13  Phos  3.2     07-13  Mg     2.3     07-13    TPro  6.8  /  Alb  3.4<L>  /  TBili  0.8  /  DBili  x   /  AST  24  /  ALT  47  /  AlkPhos  83  07-13    CARDIAC MARKERS ( 12 Jul 2022 20:42 )  x     / x     / 125 U/L / x     / x          LIVER FUNCTIONS - ( 13 Jul 2022 06:13 )  Alb: 3.4 g/dL / Pro: 6.8 g/dL / ALK PHOS: 83 U/L / ALT: 47 U/L DA / AST: 24 U/L / GGT: x           PT/INR - ( 12 Jul 2022 20:42 )   PT: 11.5 sec;   INR: 0.97 ratio       PTT - ( 12 Jul 2022 20:42 )  PTT:32.5 sec

## 2022-07-13 NOTE — CONSULT NOTE ADULT - SUBJECTIVE AND OBJECTIVE BOX
***TEMPLATE ONLY***      Patient is a 57y old  Male who presents with a chief complaint of right eye vision loss (12 Jul 2022 23:31)      HPI:  57 year old male with a past medical history of BPH, Bipolar, HLD, HTN coming to ED complaining of vision loss in his right eye. Patient states that at around 2 PM today, he suddenly lost vision in the top half field of his right eye. He also endorses feeling dizzy at that time. His vision returned after 5 minutes. He denies any headaches, fevers, chills, chest pain, cough, SOB, abd pain, n/v, diarrhea, constipation hematuria, dysuria, numbness, and weakness.    In ED VS: T 97.7, HR 87, /85, RR 16, Spo2 96% RA   CT head negative  (12 Jul 2022 23:31)           The patient was last know well at  The patient lives at home/ NH.  The patient walks without assistance/ with a cane or walker    Neurological Review of Systems:  No difficulty with language.  No vision loss or double vision.  No dizziness, vertigo or new hearing loss.  No difficulty with speech or swallowing.  No focal weakness.  No focal sensory changes.  No numbness or tingling in the bilateral lower extremities.  No difficulty with balance.  No difficulty with ambulation.      MEDICATIONS  (STANDING):  ARIPiprazole 15 milliGRAM(s) Oral daily  aspirin  chewable 81 milliGRAM(s) Oral daily  atorvastatin 80 milliGRAM(s) Oral at bedtime  benztropine 1 milliGRAM(s) Oral two times a day  enoxaparin Injectable 40 milliGRAM(s) SubCutaneous every 24 hours  gabapentin 800 milliGRAM(s) Oral three times a day  hydrochlorothiazide 12.5 milliGRAM(s) Oral daily  methylphenidate ER (CONCERTA) 54 milliGRAM(s) Oral every morning  metoprolol succinate ER 25 milliGRAM(s) Oral daily  tamsulosin 0.4 milliGRAM(s) Oral at bedtime    MEDICATIONS  (PRN):    Allergies    penicillin (Unknown)    Intolerances      PAST MEDICAL & SURGICAL HISTORY:  Diabetes      Bipolar disorder      No significant past surgical history        FAMILY HISTORY:  No pertinent family history in first degree relatives      SOCIAL HISTORY: non smoker/ former smoker/ active smoker    Review of Systems:  Constitutional: No generalized weakness. No fevers or chills.                    Eyes, Ears, Mouth, Throat: No vision loss   Respiratory: No shortness of breath or cough.                                Cardiovascular: No chest pain or palpitations  Gastrointestinal: No nausea or vomiting.                                         Genitourinary: No urinary incontinence or burning on urination.  Musculoskeletal: No joint pain.                                                           Dermatologic: No rash.  Neurological: as per HPI                                                                      Psychiatric: No behavioral problems.  Endocrine: No known hypoglycemia.               Hematologic/Lymphatic: No easy bleeding.    O:  Vital Signs Last 24 Hrs  T(C): 36.4 (13 Jul 2022 05:42), Max: 36.9 (13 Jul 2022 00:08)  T(F): 97.5 (13 Jul 2022 05:42), Max: 98.4 (13 Jul 2022 00:08)  HR: 79 (13 Jul 2022 05:42) (79 - 89)  BP: 128/86 (13 Jul 2022 05:42) (110/79 - 139/90)  BP(mean): --  RR: 18 (13 Jul 2022 05:42) (16 - 18)  SpO2: 95% (13 Jul 2022 05:42) (95% - 98%)    Parameters below as of 13 Jul 2022 05:42  Patient On (Oxygen Delivery Method): room air        General Exam:   General appearance: No acute distress                 Cardiovascular: Pedal dorsalis pulses intact bilaterally    Neurological Exam:  NIH Stroke Scale (NIHSS):   1a. LOConscious:  0-alert 1-lethargy 2-obtund 3-coma:    _____  1b. LOC Questions:  0-both 1-one 2-none                       _____  1c. LOC Commands:  0-both 1-one 2-none                     _____  2.   Gaze:  0-nl 1-partial 2-conjugate                                _____  3.   Visual:  0-nl 1-part alisson 2-full alisson 3-bilat alisson         _____  4.   Facial Palsy:  0-nl 1-minor 2-part 3-complete             _____  5.   Motor Arm:  0-nl 1-drift 2-effort 3-no effort         Left             _____                              4-no move UN-amputated                     Right  _____  6.   Motor Leg:                                                                 Left   _____                                                                                   Right _____  7.   Ataxia:  0-nl 1-one limb 2-two UN-amp                      _____  8.   Sensory:  0-nl 1-mild 2-severe                                  _____  9.   Language:  0-nl 1-mild 2-severe 3-mute                     _____  10.  Dysarthria:  0-nl 1-mild 2-severe 3-barrier                  _____  11.  Extinction/Inattention:  0-nl 1-mild 2-deep                 _____         TOTAL NIHSS       ________    Mental Status: Orientated to self, date and place.  Attention intact.  No dysarthria, aphasia or neglect.  Knowledge intact.  Registration intact.  Short and long term memory grossly intact.      Cranial Nerves: CN I - not tested.  PERRL, EOMI, VFF, no nystagmus or diplopia.  No APD.  Fundi not visualized bilaterally.  CN V1-3 intact to light touch and pinprick.  No facial asymmetry.  Hearing intact to finger rub bilaterally.  Tongue, uvula and palate midline.  Sternocleidomastoid and Trapezius intact bilaterally.    Motor:   Tone: normal.                  Strength intact throughout  No pronator drift bilaterally                      No dysmetria on finger-nose-finger or heel-shin-heel  No truncal ataxia.  No resting, postural or action tremor.  No myoclonus.    Sensation: intact to light touch, pinprick, vibration and proprioception    Deep Tendon Reflexes: 1+ bilateral biceps, triceps, brachioradialis, knee and ankle  Toes flexor bilaterally    Gait: normal and stable.  Rhomberg -vidal.    Other:      LABS:                        15.2   8.46  )-----------( 231      ( 13 Jul 2022 06:13 )             47.2     07-13    140  |  105  |  19<H>  ----------------------------<  88  3.5   |  25  |  1.12    Ca    9.2      13 Jul 2022 06:13  Phos  3.2     07-13  Mg     2.3     07-13    TPro  6.8  /  Alb  3.4<L>  /  TBili  0.8  /  DBili  x   /  AST  24  /  ALT  47  /  AlkPhos  83  07-13    PT/INR - ( 12 Jul 2022 20:42 )   PT: 11.5 sec;   INR: 0.97 ratio         PTT - ( 12 Jul 2022 20:42 )  PTT:32.5 sec    LDL  HbA1C    RADIOLOGY & ADDITIONAL STUDIES:    < from: CT Head No Cont (07.12.22 @ 21:01) > (images reviewed)    ACC: [id number] EXAM:  CT BRAIN                          PROCEDURE DATE:  07/12/2022          INTERPRETATION:  CLINICAL INFORMATION: Temporary vision loss    TECHNIQUE: Multiple axial CT images of the calvarium and brain were   obtained without contrast. Sagittal and coronal reformats were obtained.    COMPARISON: None    FINDINGS:    There is no mass effect, intracranial hemorrhage, or midline shift.    There is no CT evidence of acute territorial infarct.    The ventricles and sulci are appropriate in size and configuration for   patient's stated age.    The imaged portions of the paranasal sinuses and mastoids are well   aerated.    There is no osseous abnormality.    IMPRESSION:    No intracranial hemorrhage, mass effect, or midline shift.    --- End of Report ---            JADEN GRANT MD; Attending Radiologist  This document has been electronically signed. Jul 12 2022  9:06PM    < end of copied text >      Impression:       Recommendations:  1.             Admit to telemetry   2.             MRI brain, MRA head without contrast, Carotid duplex (CD).  If unable to get MR imaging, please consider CTA head and neck in 24hours (no need for CD in this case).  If the patient is unable to get MR and unable to get IV contrast please repeat the CTH in 24hours and get a CD.  3.             TTE  4.             Please check HbA1C and fasting lipid profile  5.             Start ASA 81mg (or ASA 325mg rectally) and Lipitor 40mg HS  6.             BP goal of normal/ permissive HTN of SBP <200/<180<160  7.             NS at 125 cc/h/ D5 NS at 125 cc/hour, if NPO, if cardiac function allows, to ensure good perfusion  8.             Frequent neurochecks  9.             Urine Tox  10.          Able to resume normal diet as passed bedside swallowing test/ NPO for now  11.          Formal speech and swallow evaluation  12.          PT evaluation  13.          STAT CTH IF the patient has sudden change in mental status or neurological exam  14.          DVT PPx    Thank you for the courtesy of this consult.         Patient is a 57y old  Male who presents with a chief complaint of right eye vision loss (12 Jul 2022 23:31)      HPI:  57 year old male with a past medical history of BPH, Bipolar, HLD, HTN coming to ED complaining of vision loss in his right eye. Patient states that at around 2 PM today, he suddenly lost vision in the top half field of his right eye. He also endorses feeling dizzy at that time. His vision returned after 5 minutes. Now back to baseline.  He denies any headaches, fevers, chills, chest pain, cough, SOB, abd pain, n/v, diarrhea, constipation hematuria, dysuria, numbness, and weakness.    In ED VS: T 97.7, HR 87, /85, RR 16, Spo2 96% RA   CT head negative  (12 Jul 2022 23:31)    The patient lives at home.  The patient walks without assistance.    Neurological Review of Systems:  No difficulty with language.  No double vision.  No dizziness, vertigo or new hearing loss.  No difficulty with speech or swallowing.  No focal weakness.  No focal sensory changes.  No numbness or tingling in the bilateral lower extremities.  No difficulty with balance.  No difficulty with ambulation.      MEDICATIONS  (STANDING):  ARIPiprazole 15 milliGRAM(s) Oral daily  aspirin  chewable 81 milliGRAM(s) Oral daily  atorvastatin 80 milliGRAM(s) Oral at bedtime  benztropine 1 milliGRAM(s) Oral two times a day  enoxaparin Injectable 40 milliGRAM(s) SubCutaneous every 24 hours  gabapentin 800 milliGRAM(s) Oral three times a day  hydrochlorothiazide 12.5 milliGRAM(s) Oral daily  methylphenidate ER (CONCERTA) 54 milliGRAM(s) Oral every morning  metoprolol succinate ER 25 milliGRAM(s) Oral daily  tamsulosin 0.4 milliGRAM(s) Oral at bedtime    MEDICATIONS  (PRN):    Allergies    penicillin (Unknown)    Intolerances      PAST MEDICAL & SURGICAL HISTORY:  Diabetes      Bipolar disorder      No significant past surgical history        FAMILY HISTORY:  No pertinent family history in first degree relatives      SOCIAL HISTORY: non smoker    Review of Systems:  Constitutional: No fever                    Eyes, Ears, Mouth, Throat: No vision loss   Respiratory: No  cough.                                Cardiovascular: No chest pain  Gastrointestinal: No vomiting.                                         Genitourinary: No urinary incontinence.  Musculoskeletal: No joint pain.                                                           Dermatologic: No rash.  Neurological: as per HPI                                                                      Psychiatric: No behavioral problems.  Endocrine: No known hypoglycemia.               Hematologic/Lymphatic: No easy bleeding.    O:  Vital Signs Last 24 Hrs  T(C): 36.4 (13 Jul 2022 05:42), Max: 36.9 (13 Jul 2022 00:08)  T(F): 97.5 (13 Jul 2022 05:42), Max: 98.4 (13 Jul 2022 00:08)  HR: 79 (13 Jul 2022 05:42) (79 - 89)  BP: 128/86 (13 Jul 2022 05:42) (110/79 - 139/90)  BP(mean): --  RR: 18 (13 Jul 2022 05:42) (16 - 18)  SpO2: 95% (13 Jul 2022 05:42) (95% - 98%)    Parameters below as of 13 Jul 2022 05:42  Patient On (Oxygen Delivery Method): room air        General Exam:   General appearance: No acute distress                 Cardiovascular: Pedal dorsalis pulses intact bilaterally    Neurological Exam:  NIH Stroke Scale (NIHSS):   1a. LOConscious:  0-alert 1-lethargy 2-obtund 3-coma:    ___0__  1b. LOC Questions:  0-both 1-one 2-none                       ____0_  1c. LOC Commands:  0-both 1-one 2-none                     ____0_  2.   Gaze:  0-nl 1-partial 2-conjugate                                ___0__  3.   Visual:  0-nl 1-part alisson 2-full alissno 3-bilat alisson         ____0_  4.   Facial Palsy:  0-nl 1-minor 2-part 3-complete             ___0__  5.   Motor Arm:  0-nl 1-drift 2-effort 3-no effort         Left             ____0_                              4-no move UN-amputated                     Right  ____0_  6.   Motor Leg:                                                                 Left   ___0__                                                                                   Right __0___  7.   Ataxia:  0-nl 1-one limb 2-two UN-amp                      __0___  8.   Sensory:  0-nl 1-mild 2-severe                                  ___0__  9.   Language:  0-nl 1-mild 2-severe 3-mute                     __0___  10.  Dysarthria:  0-nl 1-mild 2-severe 3-barrier                  _0____  11.  Extinction/Inattention:  0-nl 1-mild 2-deep                 __0___         TOTAL NIHSS       ____0___    Mental Status: Orientated to self, date and place.  Attention intact.  No dysarthria, aphasia or neglect.  Knowledge intact.  Registration intact.  Short and long term memory grossly intact.      Cranial Nerves: CN I - not tested.  PERRL, EOMI, VFF, no nystagmus or diplopia.  No APD.  Fundi not visualized bilaterally.  CN V1-3 intact to light touch.  No facial asymmetry.  Hearing intact to finger rub bilaterally.  Tongue, uvula and palate midline.  Sternocleidomastoid and Trapezius intact bilaterally.    Motor:   Tone: normal.                  Strength intact throughout  No pronator drift bilaterally                      No dysmetria on finger-nose-finger or heel-shin-heel  No truncal ataxia.  No resting, postural or action tremor.  No myoclonus.    Sensation: intact to light touch    Deep Tendon Reflexes: 1+ bilateral biceps, triceps, brachioradialis, knee and ankle  Toes flexor bilaterally    Gait: normal and stable.     Other:      LABS:                        15.2   8.46  )-----------( 231      ( 13 Jul 2022 06:13 )             47.2     07-13    140  |  105  |  19<H>  ----------------------------<  88  3.5   |  25  |  1.12    Ca    9.2      13 Jul 2022 06:13  Phos  3.2     07-13  Mg     2.3     07-13    TPro  6.8  /  Alb  3.4<L>  /  TBili  0.8  /  DBili  x   /  AST  24  /  ALT  47  /  AlkPhos  83  07-13    PT/INR - ( 12 Jul 2022 20:42 )   PT: 11.5 sec;   INR: 0.97 ratio         PTT - ( 12 Jul 2022 20:42 )  PTT:32.5 sec    LDL  HbA1C    RADIOLOGY & ADDITIONAL STUDIES:        < from: CT Head No Cont (07.12.22 @ 21:01) > (images reviewed)    ACC: [id number] EXAM:  CT BRAIN                          PROCEDURE DATE:  07/12/2022          INTERPRETATION:  CLINICAL INFORMATION: Temporary vision loss    TECHNIQUE: Multiple axial CT images of the calvarium and brain were   obtained without contrast. Sagittal and coronal reformats were obtained.    COMPARISON: None    FINDINGS:    There is no mass effect, intracranial hemorrhage, or midline shift.    There is no CT evidence of acute territorial infarct.    The ventricles and sulci are appropriate in size and configuration for   patient's stated age.    The imaged portions of the paranasal sinuses and mastoids are well   aerated.    There is no osseous abnormality.    IMPRESSION:    No intracranial hemorrhage, mass effect, or midline shift.    --- End of Report ---            JADEN GRANT MD; Attending Radiologist  This document has been electronically signed. Jul 12 2022  9:06PM    < end of copied text >      R

## 2022-07-14 ENCOUNTER — TRANSCRIPTION ENCOUNTER (OUTPATIENT)
Age: 57
End: 2022-07-14

## 2022-07-14 VITALS
OXYGEN SATURATION: 95 % | DIASTOLIC BLOOD PRESSURE: 78 MMHG | SYSTOLIC BLOOD PRESSURE: 108 MMHG | HEART RATE: 92 BPM | TEMPERATURE: 97 F | RESPIRATION RATE: 19 BRPM

## 2022-07-14 LAB
ANION GAP SERPL CALC-SCNC: 5 MMOL/L — SIGNIFICANT CHANGE UP (ref 5–17)
BUN SERPL-MCNC: 15 MG/DL — SIGNIFICANT CHANGE UP (ref 7–18)
CALCIUM SERPL-MCNC: 9.4 MG/DL — SIGNIFICANT CHANGE UP (ref 8.4–10.5)
CHLORIDE SERPL-SCNC: 106 MMOL/L — SIGNIFICANT CHANGE UP (ref 96–108)
CO2 SERPL-SCNC: 28 MMOL/L — SIGNIFICANT CHANGE UP (ref 22–31)
CREAT SERPL-MCNC: 1.21 MG/DL — SIGNIFICANT CHANGE UP (ref 0.5–1.3)
EGFR: 69 ML/MIN/1.73M2 — SIGNIFICANT CHANGE UP
GLUCOSE SERPL-MCNC: 86 MG/DL — SIGNIFICANT CHANGE UP (ref 70–99)
HCT VFR BLD CALC: 45.8 % — SIGNIFICANT CHANGE UP (ref 39–50)
HGB BLD-MCNC: 15.1 G/DL — SIGNIFICANT CHANGE UP (ref 13–17)
MCHC RBC-ENTMCNC: 28.5 PG — SIGNIFICANT CHANGE UP (ref 27–34)
MCHC RBC-ENTMCNC: 33 GM/DL — SIGNIFICANT CHANGE UP (ref 32–36)
MCV RBC AUTO: 86.4 FL — SIGNIFICANT CHANGE UP (ref 80–100)
NRBC # BLD: 0 /100 WBCS — SIGNIFICANT CHANGE UP (ref 0–0)
PLATELET # BLD AUTO: 220 K/UL — SIGNIFICANT CHANGE UP (ref 150–400)
POTASSIUM SERPL-MCNC: 3.6 MMOL/L — SIGNIFICANT CHANGE UP (ref 3.5–5.3)
POTASSIUM SERPL-SCNC: 3.6 MMOL/L — SIGNIFICANT CHANGE UP (ref 3.5–5.3)
RBC # BLD: 5.3 M/UL — SIGNIFICANT CHANGE UP (ref 4.2–5.8)
RBC # FLD: 13.4 % — SIGNIFICANT CHANGE UP (ref 10.3–14.5)
SODIUM SERPL-SCNC: 139 MMOL/L — SIGNIFICANT CHANGE UP (ref 135–145)
WBC # BLD: 8.18 K/UL — SIGNIFICANT CHANGE UP (ref 3.8–10.5)
WBC # FLD AUTO: 8.18 K/UL — SIGNIFICANT CHANGE UP (ref 3.8–10.5)

## 2022-07-14 PROCEDURE — 70450 CT HEAD/BRAIN W/O DYE: CPT | Mod: MA

## 2022-07-14 PROCEDURE — 93306 TTE W/DOPPLER COMPLETE: CPT

## 2022-07-14 PROCEDURE — 84484 ASSAY OF TROPONIN QUANT: CPT

## 2022-07-14 PROCEDURE — 85652 RBC SED RATE AUTOMATED: CPT

## 2022-07-14 PROCEDURE — 83735 ASSAY OF MAGNESIUM: CPT

## 2022-07-14 PROCEDURE — 85025 COMPLETE CBC W/AUTO DIFF WBC: CPT

## 2022-07-14 PROCEDURE — 70551 MRI BRAIN STEM W/O DYE: CPT

## 2022-07-14 PROCEDURE — 71046 X-RAY EXAM CHEST 2 VIEWS: CPT

## 2022-07-14 PROCEDURE — 87635 SARS-COV-2 COVID-19 AMP PRB: CPT

## 2022-07-14 PROCEDURE — 85610 PROTHROMBIN TIME: CPT

## 2022-07-14 PROCEDURE — 83036 HEMOGLOBIN GLYCOSYLATED A1C: CPT

## 2022-07-14 PROCEDURE — 93005 ELECTROCARDIOGRAM TRACING: CPT

## 2022-07-14 PROCEDURE — 80061 LIPID PANEL: CPT

## 2022-07-14 PROCEDURE — 82550 ASSAY OF CK (CPK): CPT

## 2022-07-14 PROCEDURE — 80048 BASIC METABOLIC PNL TOTAL CA: CPT

## 2022-07-14 PROCEDURE — 36415 COLL VENOUS BLD VENIPUNCTURE: CPT

## 2022-07-14 PROCEDURE — 80053 COMPREHEN METABOLIC PANEL: CPT

## 2022-07-14 PROCEDURE — 86140 C-REACTIVE PROTEIN: CPT

## 2022-07-14 PROCEDURE — 84443 ASSAY THYROID STIM HORMONE: CPT

## 2022-07-14 PROCEDURE — 85730 THROMBOPLASTIN TIME PARTIAL: CPT

## 2022-07-14 PROCEDURE — 86803 HEPATITIS C AB TEST: CPT

## 2022-07-14 PROCEDURE — 70544 MR ANGIOGRAPHY HEAD W/O DYE: CPT

## 2022-07-14 PROCEDURE — G0378: CPT

## 2022-07-14 PROCEDURE — 85027 COMPLETE CBC AUTOMATED: CPT

## 2022-07-14 PROCEDURE — 93880 EXTRACRANIAL BILAT STUDY: CPT

## 2022-07-14 PROCEDURE — 84100 ASSAY OF PHOSPHORUS: CPT

## 2022-07-14 PROCEDURE — 99239 HOSP IP/OBS DSCHRG MGMT >30: CPT

## 2022-07-14 PROCEDURE — 99285 EMERGENCY DEPT VISIT HI MDM: CPT | Mod: 25

## 2022-07-14 RX ORDER — CLOPIDOGREL BISULFATE 75 MG/1
1 TABLET, FILM COATED ORAL
Qty: 21 | Refills: 0
Start: 2022-07-14 | End: 2022-08-03

## 2022-07-14 RX ORDER — ATORVASTATIN CALCIUM 80 MG/1
1 TABLET, FILM COATED ORAL
Qty: 30 | Refills: 0
Start: 2022-07-14 | End: 2022-08-12

## 2022-07-14 RX ORDER — ASPIRIN/CALCIUM CARB/MAGNESIUM 324 MG
1 TABLET ORAL
Qty: 30 | Refills: 0
Start: 2022-07-14 | End: 2022-08-12

## 2022-07-14 RX ORDER — ROSUVASTATIN CALCIUM 5 MG/1
0 TABLET ORAL
Qty: 0 | Refills: 0 | DISCHARGE

## 2022-07-14 RX ADMIN — ENOXAPARIN SODIUM 40 MILLIGRAM(S): 100 INJECTION SUBCUTANEOUS at 05:57

## 2022-07-14 RX ADMIN — Medication 12.5 MILLIGRAM(S): at 06:49

## 2022-07-14 RX ADMIN — Medication 81 MILLIGRAM(S): at 12:56

## 2022-07-14 RX ADMIN — Medication 25 MILLIGRAM(S): at 05:59

## 2022-07-14 RX ADMIN — CLOPIDOGREL BISULFATE 75 MILLIGRAM(S): 75 TABLET, FILM COATED ORAL at 12:56

## 2022-07-14 RX ADMIN — GABAPENTIN 800 MILLIGRAM(S): 400 CAPSULE ORAL at 05:57

## 2022-07-14 RX ADMIN — Medication 1 MILLIGRAM(S): at 06:50

## 2022-07-14 RX ADMIN — GABAPENTIN 800 MILLIGRAM(S): 400 CAPSULE ORAL at 14:38

## 2022-07-14 RX ADMIN — ARIPIPRAZOLE 15 MILLIGRAM(S): 15 TABLET ORAL at 14:36

## 2022-07-14 NOTE — DISCHARGE NOTE PROVIDER - HOSPITAL COURSE
57 y M BPH, Bipolar, HLD, HTN p/w vision loss in his RIGHT eye at 2 PM (7/12). Pt suddenly lost vision in the top half field of his right eye and complained of dizziness. His vision returned after 5 minutes. Pt had no residual deficits in the ED, w/ complete resolution of symptoms. Pt had an ABCD2 score of 0 and an NIHSS score of 0 on arrival. CT head negative for acute ischemia or bleed. Pt started an ASA 81 mg, Plavix 75 mg, and Atorvastatin 40 mg. Neuro was consulted- recommended MRI/ MRA to evaluate for large vessel occlusion. MRI/MRA showed results showed no acute infarct or large vessel occlusion. Echo w/ bubble study was ordered, showed EF > 55% w/ concentric LV hypertrophy and GIDD; NO shunt. BP was wnl, resumed home HCTZ 12.5 mg qd and Metoprolol succ 25 mg. Lipid panel notable for low HDL levels (40), otherwise wnl and Hba1c was 5.8. Pt resumed on home meds for Bipolar disorder- Abilify 15 mg. Admited to tele for CVA vs TIA work up. Symptoms remained absent throughout stay. Pt has not experienced any similar or new onset neurological symptoms since admission. Neuro Dr. Osborne recommended to continue     Given patient's improved clinical status and current hemodynamic stability, decision was made to discharge. Discussed with attending. Please refer to patient's complete medical chart with documents for a full hospital course, for this is only a brief summary.

## 2022-07-14 NOTE — DISCHARGE NOTE NURSING/CASE MANAGEMENT/SOCIAL WORK - PATIENT PORTAL LINK FT
You can access the FollowMyHealth Patient Portal offered by [facility] by registering at the following website: [email or web address]. By joining [facility]’s FollowMyHealth portal, you will also be able to view your health information using other applications (apps) compatible with our system.

## 2022-07-14 NOTE — DISCHARGE NOTE PROVIDER - NSDCCPCAREPLAN_GEN_ALL_CORE_FT
PRINCIPAL DISCHARGE DIAGNOSIS  Diagnosis: Brain TIA  Assessment and Plan of Treatment: TIA is a temporary blockage of blood flow to the brain. Because most TIA symptoms last from only a few minutes up to 24 hours, they are often dismissed and not taken seriously. But this is a big mistake. TIAs may signal a full-blown stroke ahead. You presented with transient neurological symptoms and were admitted for stroke work up. Your CT scan of your head showed - -  and EKG showed - -. We admitted you to the cardiac telemetry unit for monitoring for any abnormal heart rhythyms. We *did/did NOT see any arrythmia. Neuro evaluated you and made recommendations for an MRI which showed ***. Your MRI showed : - - - . Please continue Aspirin daily and lipid lowering agent atorvastatin daily. Neurology recommended that you also take Plavix for 21 days total. Please follow up as outpatient with Neurology and primary care. A TIA is a warning sign of future stroke. Do not smoke and control your blood pressure and diabetes as these all increase your risk of stroke.      SECONDARY DISCHARGE DIAGNOSES  Diagnosis: Bipolar disorder  Assessment and Plan of Treatment: Bipolar disorder is a psychiatric disorder associated with episodes of mood swings ranging from depressive lows to manic highs. You have a history of this disorder and take - - - for medical management. On arrival your bipolar medications were *continued/HELD. Please continue your medications : - - - and follow up with your primary doctor and psychiatrist.    Diagnosis: HTN (hypertension)  Assessment and Plan of Treatment: Hypertension is another name for high blood pressure. It can lead to severe health complications and increase the risk of heart disease, stroke, and sometimes death. Blood pressure is the force that a person's blood exerts against the walls of their blood vessels. Your blood pressure was managed with a salt restricted DASH diet, ---. WE HELD YOUR - - - On discharge we are sending you home on ---. Please continue these medicines. Measure your blood pressure 3 times a day with a goal around 120/80. Follow up with your primary doctor. Eat a healthy diet and limit your salty and fatty food intake.    Diagnosis: DM (diabetes mellitus)  Assessment and Plan of Treatment: DM2 (diabetes mellitus, type 2) Diabetes mellitus is a disorder in which the body does not produce enough or respond normally to insulin, causing blood sugar (glucose) levels to be abnormally high. Urination and thirst are increased, and people may lose weight even if they are not trying to. Your hemoglobin A1c is a test that shows your average glucose over the last 3 months. Your A1c value was - - - -which means your average blood sugar is around  - - -. This shows that your diabetes is relatively - -- controlled.  Please continue your medications and follow with your primary doctor. Check your blood sugar before meals and at bedtime with your insulin regimen. Eat a healthy diet low in carbohydrates and sugars and exercise 30-40 minutes a day 3-4 times a week.     PRINCIPAL DISCHARGE DIAGNOSIS  Diagnosis: Brain TIA  Assessment and Plan of Treatment: TIA is a temporary blockage of blood flow to the brain. Because most TIA symptoms last from only a few minutes up to 24 hours, they are often dismissed and not taken seriously. But this is a big mistake. TIAs may signal a full-blown stroke ahead. You presented with transient neurological symptoms of right eye partial vision loss and were admitted for stroke work up. Your CT scan of your head showed no acute findings and EKG showed normal sinus rhythym with no ischemic changes. We admitted you to the cardiac telemetry unit for monitoring for any abnormal heart rhythyms. We did NOT see any arrythmia during cardiac monitoring. Neuro evaluated you and made recommendations for an MRI which showed no large vessel occlusion or ischemic change. Please continue Aspirin daily and lipid lowering agent rosuvastatin daily.   Neurology recommended that you also take PLAVIX for 21 days total/ 3 weeks. Please follow up as outpatient with Neurology and primary care. A TIA is a warning sign of future stroke. Do not smoke and control your blood pressure and diabetes as these all increase your risk of stroke.  Please follow up with PCP in 1 week and Neuro in 1 Month.      SECONDARY DISCHARGE DIAGNOSES  Diagnosis: Bipolar disorder  Assessment and Plan of Treatment: Bipolar disorder is a psychiatric disorder associated with episodes of mood swings ranging from depressive lows to manic highs. You have a history of this disorder and take aripriprazole for medical management. On arrival your bipolar medications were continued. Please continue your medications and follow up with your primary doctor and psychiatrist.    Diagnosis: HTN (hypertension)  Assessment and Plan of Treatment: Hypertension is another name for high blood pressure. It can lead to severe health complications and increase the risk of heart disease, stroke, and sometimes death. Blood pressure is the force that a person's blood exerts against the walls of their blood vessels. Your blood pressure was managed with a salt restricted DASH diet. WE HELD YOUR BP meds for permissive hypertension on admission and resumed them after 24 hours. On discharge we are sending you home on your home regimen of Hydrochlorothiazide and metoprolol daily. Please continue these medicines. Measure your blood pressure 3 times a day with a goal around 120/80. Follow up with your primary doctor. Eat a healthy diet and limit your salty and fatty food intake. Please follow up with PCP in 1 week    Diagnosis: DM (diabetes mellitus)  Assessment and Plan of Treatment: DM2 (diabetes mellitus, type 2) Diabetes mellitus is a disorder in which the body does not produce enough or respond normally to insulin, causing blood sugar (glucose) levels to be abnormally high. Urination and thirst are increased, and people may lose weight even if they are not trying to. Your hemoglobin A1c is a test that shows your average glucose over the last 3 months. Your A1c value was 5.8 which means your average blood sugar is around  120. This shows that you qualify as PRE-diabetes. Eat a healthy diet low in carbohydrates and sugars and exercise 30-40minutes a day 3-4 times a week. Diet and weight loss can PREVENT progression to diabetes.     PRINCIPAL DISCHARGE DIAGNOSIS  Diagnosis: Brain TIA  Assessment and Plan of Treatment: TIA is a temporary blockage of blood flow to the brain. Because most TIA symptoms last from only a few minutes up to 24 hours, they are often dismissed and not taken seriously. But this is a big mistake. TIAs may signal a full-blown stroke ahead. You presented with transient neurological symptoms of right eye partial vision loss and were admitted for stroke work up. Your CT scan of your head showed no acute findings and EKG showed normal sinus rhythym with no ischemic changes. We admitted you to the cardiac telemetry unit for monitoring for any abnormal heart rhythyms. We did NOT see any arrythmia during cardiac monitoring. Neuro evaluated you and made recommendations for an MRI which showed no large vessel occlusion or ischemic change. Please continue Aspirin daily and lipid lowering agent rosuvastatin daily.   Neurology recommended that you also take PLAVIX for 21 days total/ 3 weeks. Please follow up as outpatient with Neurology and primary care. A TIA is a warning sign of future stroke. Do not smoke and control your blood pressure and diabetes as these all increase your risk of stroke.  Please follow up with PCP in 1 week and Neuro in 1 Month.      SECONDARY DISCHARGE DIAGNOSES  Diagnosis: Change in vision  Assessment and Plan of Treatment: You had a sudden onset TEMPORARY vision change in your RIGHT eye with vision loss of the upper half of your visual field. Your CT scan and MRI MRA of your brain was NEGATIVE for stroke or large vessel occlusion. Your vision change resolved. You need to PLEASE FOLLOW UP WITH OPTHALMOLOGY in 1 week    Diagnosis: Bipolar disorder  Assessment and Plan of Treatment: Bipolar disorder is a psychiatric disorder associated with episodes of mood swings ranging from depressive lows to manic highs. You have a history of this disorder and take aripriprazole for medical management. On arrival your bipolar medications were continued. Please continue your medications and follow up with your primary doctor and psychiatrist.    Diagnosis: HTN (hypertension)  Assessment and Plan of Treatment: Hypertension is another name for high blood pressure. It can lead to severe health complications and increase the risk of heart disease, stroke, and sometimes death. Blood pressure is the force that a person's blood exerts against the walls of their blood vessels. Your blood pressure was managed with a salt restricted DASH diet. WE HELD YOUR BP meds for permissive hypertension on admission and resumed them after 24 hours. On discharge we are sending you home on your home regimen of Hydrochlorothiazide and metoprolol daily. Please continue these medicines. Measure your blood pressure 3 times a day with a goal around 120/80. Follow up with your primary doctor. Eat a healthy diet and limit your salty and fatty food intake. Please follow up with PCP in 1 week    Diagnosis: DM (diabetes mellitus)  Assessment and Plan of Treatment: DM2 (diabetes mellitus, type 2) Diabetes mellitus is a disorder in which the body does not produce enough or respond normally to insulin, causing blood sugar (glucose) levels to be abnormally high. Urination and thirst are increased, and people may lose weight even if they are not trying to. Your hemoglobin A1c is a test that shows your average glucose over the last 3 months. Your A1c value was 5.8 which means your average blood sugar is around  120. This shows that you qualify as PRE-diabetes. Eat a healthy diet low in carbohydrates and sugars and exercise 30-40minutes a day 3-4 times a week. Diet and weight loss can PREVENT progression to diabetes.

## 2022-07-14 NOTE — DISCHARGE NOTE PROVIDER - NSDCMRMEDTOKEN_GEN_ALL_CORE_FT
ARIPiprazole 15 mg oral tablet: 1 tab(s) orally once a day  benztropine 1 mg oral tablet: 1 tab(s) orally 2 times a day  gabapentin 800 mg oral tablet: 1 tab(s) orally 3 times a day  hydroCHLOROthiazide 12.5 mg oral tablet: 1 tab(s) orally once a day  losartan 100 mg oral tablet: 1 tab(s) orally once a day  methylphenidate 55 mg/24 hr oral capsule, (20/80 release) extended release: 1 cap(s) orally once a day (in the morning)  metoprolol succinate 25 mg oral tablet, extended release: 1 tab(s) orally once a day  rosuvastatin 20 mg oral tablet: tab(s) orally once a day  tamsulosin 0.4 mg oral capsule: 1 cap(s) orally once a day   ARIPiprazole 15 mg oral tablet: 1 tab(s) orally once a day  aspirin 81 mg oral tablet, chewable: 1 tab(s) orally once a day  benztropine 1 mg oral tablet: 1 tab(s) orally 2 times a day  gabapentin 800 mg oral tablet: 1 tab(s) orally 3 times a day  hydroCHLOROthiazide 12.5 mg oral tablet: 1 tab(s) orally once a day  losartan 100 mg oral tablet: 1 tab(s) orally once a day  methylphenidate 55 mg/24 hr oral capsule, (20/80 release) extended release: 1 cap(s) orally once a day (in the morning)  metoprolol succinate 25 mg oral tablet, extended release: 1 tab(s) orally once a day  Plavix 75 mg oral tablet: 1 tab(s) orally once a day  rosuvastatin 20 mg oral tablet: tab(s) orally once a day  tamsulosin 0.4 mg oral capsule: 1 cap(s) orally once a day   ARIPiprazole 15 mg oral tablet: 1 tab(s) orally once a day  aspirin 81 mg oral tablet, chewable: 1 tab(s) orally once a day  benztropine 1 mg oral tablet: 1 tab(s) orally 2 times a day  gabapentin 800 mg oral tablet: 1 tab(s) orally 3 times a day  hydroCHLOROthiazide 12.5 mg oral tablet: 1 tab(s) orally once a day  Lipitor 40 mg oral tablet: 1 tab(s) orally once a day (at bedtime)  losartan 100 mg oral tablet: 1 tab(s) orally once a day  methylphenidate 55 mg/24 hr oral capsule, (20/80 release) extended release: 1 cap(s) orally once a day (in the morning)  metoprolol succinate 25 mg oral tablet, extended release: 1 tab(s) orally once a day  Plavix 75 mg oral tablet: 1 tab(s) orally once a day  tamsulosin 0.4 mg oral capsule: 1 cap(s) orally once a day

## 2022-07-14 NOTE — DISCHARGE NOTE PROVIDER - CARE PROVIDER_API CALL
Leanne Osborne)  Neurology  [id number] [address] [place] [id number]  Phone: [phone]  Fax: [phone]  Follow Up Time: 1 week

## 2022-07-14 NOTE — DISCHARGE NOTE NURSING/CASE MANAGEMENT/SOCIAL WORK - NSDCPEFALRISK_GEN_ALL_CORE
For information on Fall & Injury Prevention, visit: [email or web address] OR  [email or web address] OR  https://www.cdc.gov/steadi/patient.html

## 2022-07-18 PROBLEM — F31.9 BIPOLAR DISORDER, UNSPECIFIED: Chronic | Status: ACTIVE | Noted: 2022-07-12

## 2022-07-18 PROBLEM — E11.9 TYPE 2 DIABETES MELLITUS WITHOUT COMPLICATIONS: Chronic | Status: ACTIVE | Noted: 2022-07-12

## 2022-10-25 ENCOUNTER — APPOINTMENT (OUTPATIENT)
Dept: NEUROLOGY | Facility: CLINIC | Age: 57
End: 2022-10-25

## 2023-10-18 ENCOUNTER — APPOINTMENT (OUTPATIENT)
Dept: GASTROENTEROLOGY | Facility: CLINIC | Age: 58
End: 2023-10-18
Payer: COMMERCIAL

## 2023-10-18 VITALS
OXYGEN SATURATION: 98 % | BODY MASS INDEX: 33.86 KG/M2 | TEMPERATURE: 97 F | DIASTOLIC BLOOD PRESSURE: 95 MMHG | WEIGHT: 250 LBS | HEART RATE: 110 BPM | HEIGHT: 72 IN | SYSTOLIC BLOOD PRESSURE: 170 MMHG

## 2023-10-18 DIAGNOSIS — K64.8 OTHER HEMORRHOIDS: ICD-10-CM

## 2023-10-18 DIAGNOSIS — K57.90 DIVERTICULOSIS OF INTESTINE, PART UNSPECIFIED, W/OUT PERFORATION OR ABSCESS W/OUT BLEEDING: ICD-10-CM

## 2023-10-18 DIAGNOSIS — R11.0 NAUSEA: ICD-10-CM

## 2023-10-18 DIAGNOSIS — R19.8 OTHER SPECIFIED SYMPTOMS AND SIGNS INVOLVING THE DIGESTIVE SYSTEM AND ABDOMEN: ICD-10-CM

## 2023-10-18 DIAGNOSIS — R10.32 RIGHT LOWER QUADRANT PAIN: ICD-10-CM

## 2023-10-18 DIAGNOSIS — R10.31 RIGHT LOWER QUADRANT PAIN: ICD-10-CM

## 2023-10-18 DIAGNOSIS — K63.5 POLYP OF COLON: ICD-10-CM

## 2023-10-18 PROCEDURE — 99214 OFFICE O/P EST MOD 30 MIN: CPT

## 2023-10-18 RX ORDER — DICYCLOMINE HYDROCHLORIDE 10 MG/1
10 CAPSULE ORAL
Qty: 90 | Refills: 3 | Status: ACTIVE | COMMUNITY
Start: 2023-10-18 | End: 1900-01-01

## 2023-10-18 RX ORDER — SIMETHICONE 125 MG/1
125 TABLET, CHEWABLE ORAL
Qty: 120 | Refills: 2 | Status: ACTIVE | COMMUNITY
Start: 2023-10-18 | End: 1900-01-01

## 2023-10-18 RX ORDER — FAMOTIDINE 40 MG/1
40 TABLET, FILM COATED ORAL
Qty: 60 | Refills: 3 | Status: ACTIVE | COMMUNITY
Start: 2023-10-18 | End: 1900-01-01

## 2023-10-26 ENCOUNTER — NON-APPOINTMENT (OUTPATIENT)
Age: 58
End: 2023-10-26

## 2023-10-27 LAB
BACTERIA STL CULT: NORMAL
C DIFF TOXIN B QL PCR REFLEX: NORMAL
GDH ANTIGEN: NOT DETECTED
GI PCR PANEL: NOT DETECTED
HEMOCCULT STL QL: NEGATIVE
TOXIN A AND B: NOT DETECTED

## 2023-10-30 LAB — CALPROTECTIN FECAL: 21 UG/G

## 2023-10-31 LAB — PANCREATIC ELASTASE, FECAL: 480 CD:794062645

## 2023-11-07 ENCOUNTER — NON-APPOINTMENT (OUTPATIENT)
Age: 58
End: 2023-11-07

## 2023-11-07 LAB — LACTOFERRIN STL-MCNC: <1 CD:794062635

## 2024-03-27 ENCOUNTER — APPOINTMENT (OUTPATIENT)
Dept: GASTROENTEROLOGY | Facility: CLINIC | Age: 59
End: 2024-03-27

## (undated) DEVICE — CLAMP BX HOT RAD JAW 3

## (undated) DEVICE — TUBING IV SET GRAVITY 3Y 100" MACRO

## (undated) DEVICE — FORMALIN CUPS 10% BUFFERED

## (undated) DEVICE — VALVE ENDOSCOPE DEFENDO SINGLE USE

## (undated) DEVICE — SYR LUER LOK 50CC

## (undated) DEVICE — RETRIEVER ROTH NET PLATINUM-UNIVERSAL

## (undated) DEVICE — FORCEP BIOPSY 2.5MM DISP

## (undated) DEVICE — SNARE LOOP POLY DISP 30MM LOOP

## (undated) DEVICE — CATH ELCTR GLIDE PRB 7FR

## (undated) DEVICE — TUBING CANNULA SALTER LABS NASAL ADULT 7FT

## (undated) DEVICE — DRSG GAUZE 4X4"

## (undated) DEVICE — LUBE JELLY FOILPACK 36GM STERILE

## (undated) DEVICE — SOL INJ NS 0.9% 500ML 1-PORT

## (undated) DEVICE — NDL INJ SCLERO INTERJECT 23G

## (undated) DEVICE — KIT ENDO PROCEDURE CUST W/VLV

## (undated) DEVICE — ADAPTER ENDO CHNL SINGLE USE

## (undated) DEVICE — Device

## (undated) DEVICE — FORCEP RADIAL JAW 4 JUMBO NO NDL DISP

## (undated) DEVICE — SENSOR O2 FINGER ADULT 24/CA

## (undated) DEVICE — TUBING MEDI-VAC W MAXIGRIP CONNECTORS 1/4"X6'